# Patient Record
Sex: MALE | Race: OTHER | NOT HISPANIC OR LATINO
[De-identification: names, ages, dates, MRNs, and addresses within clinical notes are randomized per-mention and may not be internally consistent; named-entity substitution may affect disease eponyms.]

---

## 2020-02-24 ENCOUNTER — APPOINTMENT (OUTPATIENT)
Dept: ORTHOPEDIC SURGERY | Facility: CLINIC | Age: 60
End: 2020-02-24
Payer: COMMERCIAL

## 2020-02-24 DIAGNOSIS — Z96.649 PRESENCE OF UNSPECIFIED ARTIFICIAL HIP JOINT: ICD-10-CM

## 2020-02-24 DIAGNOSIS — Z96.641 PRESENCE OF RIGHT ARTIFICIAL HIP JOINT: ICD-10-CM
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
## 2020-02-24 DIAGNOSIS — F17.200 NICOTINE DEPENDENCE, UNSPECIFIED, UNCOMPLICATED: ICD-10-CM

## 2020-02-24 DIAGNOSIS — Z78.9 OTHER SPECIFIED HEALTH STATUS: ICD-10-CM

## 2020-02-24 PROBLEM — Z00.00 ENCOUNTER FOR PREVENTIVE HEALTH EXAMINATION: Status: ACTIVE | Noted: 2020-02-24

## 2020-02-24 PROCEDURE — 73502 X-RAY EXAM HIP UNI 2-3 VIEWS: CPT | Mod: RT

## 2020-02-24 PROCEDURE — 99214 OFFICE O/P EST MOD 30 MIN: CPT

## 2020-02-24 NOTE — HISTORY OF PRESENT ILLNESS
[de-identified] : 59 year old male s/p right total hip replacement presents to the office today after having fallen at work about 1.5 weeks ago. Pt reports falling between beams on a trailer hitting his right hip and left knee. Pt reports a pain that is achy in nature. He reports being able to bear weight immediately after his fall. He states that yesterday he had difficulty with sitting down and the other day difficulty with getting up after laying down. Today, patient reports pain is less. Pt is here today to make sure his right hip is still doing okay.

## 2020-02-24 NOTE — ASSESSMENT
[FreeTextEntry1] : Patient comes in today for an unscheduled visit after having a fall while at work a week and a half ago. He was able to work initially but in the days after he became progressively more sore over the lateral and posterior aspect of the right hip. He states he's doing a little better today. He came back to have it checked out to make sure he did not damage his hip replacement. On physical exam he walks without a limp he has a relatively painless range of motion of the hip although he has some tenderness posterior laterally over the gluteal muscles. His radiographs look good my impression is that this is a resolving muscle and/or bone contusion. But it is improving he is given reassurance everything spine he can followup in 2 years time

## 2021-06-14 ENCOUNTER — APPOINTMENT (OUTPATIENT)
Dept: ORTHOPEDIC SURGERY | Facility: CLINIC | Age: 61
End: 2021-06-14
Payer: COMMERCIAL

## 2021-06-14 VITALS — TEMPERATURE: 98 F

## 2021-06-14 DIAGNOSIS — M25.562 PAIN IN LEFT KNEE: ICD-10-CM

## 2021-06-14 DIAGNOSIS — M17.12 UNILATERAL PRIMARY OSTEOARTHRITIS, LEFT KNEE: ICD-10-CM

## 2021-06-14 PROCEDURE — 99072 ADDL SUPL MATRL&STAF TM PHE: CPT

## 2021-06-14 PROCEDURE — 73562 X-RAY EXAM OF KNEE 3: CPT | Mod: LT

## 2021-06-14 PROCEDURE — 99213 OFFICE O/P EST LOW 20 MIN: CPT

## 2021-06-14 NOTE — PHYSICAL EXAM
[de-identified] : Left Knee\par Inspection: no effusion or erythema.\par Wounds: none.\par Alignment: normal.\par Palpation: medial joint line tenderness \par ROM: 5-90 degrees\par Ligamentous laxity: all ligaments appear stable\par Muscle Test: good quad strength.\par Leg examination: calf is soft and non-tender. [de-identified] : Radiographs done today AP lateral and skyline of the left knee shows significantly narrowed medial joint space in the left knee and the lateral PF joint on the skyline view.

## 2021-06-14 NOTE — ASSESSMENT
[FreeTextEntry1] : Pt presents to the office with complaints of left knee pain , secondary to OA. He is currently taking OTC NSAIDs. Based on the severity of his symptoms i think we should try a Rx NSAID. We will place him on Celebrex and we also discussed gel injections but we will hold off on those for now. F/u 3-4 weeks.

## 2021-07-09 ENCOUNTER — APPOINTMENT (OUTPATIENT)
Dept: ORTHOPEDIC SURGERY | Facility: CLINIC | Age: 61
End: 2021-07-09

## 2021-07-12 ENCOUNTER — RX RENEWAL (OUTPATIENT)
Age: 61
End: 2021-07-12

## 2021-07-12 RX ORDER — CELECOXIB 200 MG/1
200 CAPSULE ORAL
Qty: 30 | Refills: 0 | Status: DISCONTINUED | COMMUNITY
Start: 2021-06-14 | End: 2021-07-12